# Patient Record
Sex: FEMALE | Race: WHITE
[De-identification: names, ages, dates, MRNs, and addresses within clinical notes are randomized per-mention and may not be internally consistent; named-entity substitution may affect disease eponyms.]

---

## 2021-12-06 ENCOUNTER — NURSE TRIAGE (OUTPATIENT)
Dept: OTHER | Facility: CLINIC | Age: 36
End: 2021-12-06

## 2021-12-06 NOTE — TELEPHONE ENCOUNTER
Received call from Tonja Ceballos at [facility] with [employer]. Brief description of triage: dizziness    Triage indicates for patient to go to office now. Pt does not have a PCP. Pt instructed to go to urgent care, walk in clinic, Emergency Department for care to not wait for new pt appt. Agreeable. Care advice provided, patient verbalizes understanding; denies any other questions or concerns; instructed to call back for any new or worsening symptoms. Scheduling dept to call pt back to get established as new patient. 10:41:Maricel Fuentes Abide with scheduling acknowledged. Attention Provider: Thank you for allowing me to participate in the care of your patient. The patient was connected to triage in response to information provided to the [facility]. Please do not respond through this encounter as the response is not directed to a shared pool. Reason for Disposition   Lightheadedness (dizziness) present now, after 2 hours of rest and fluids    Answer Assessment - Initial Assessment Questions  1. DESCRIPTION: \"Describe your dizziness. \"      Woozy. 2. LIGHTHEADED: \"Do you feel lightheaded? \" (e.g., somewhat faint, woozy, weak upon standing)      Near fainting sensation    3. VERTIGO: \"Do you feel like either you or the room is spinning or tilting? \" (i.e. vertigo)      Sometimes sees speck of light    4. SEVERITY: \"How bad is it? \"  \"Do you feel like you are going to faint? \" \"Can you stand and walk? \"    - MILD - walking normally    - MODERATE - interferes with normal activities (e.g., work, school)     - SEVERE - unable to stand, requires support to walk, feels like passing out now. Sometimes feels like she has to hold onto something. Not like going to fall over. Has fainted in the past. Sits down before she gets to that point. States was dizzy for hours, had 30 mins without and then dizziness returned. Constant today. 5. ONSET:  \"When did the dizziness begin? \"      Yesterday.      6. AGGRAVATING FACTORS: \"Does anything make it worse? \" (e.g., standing, change in head position)      Constant today. Denies known trigger    7. HEART RATE: \"Can you tell me your heart rate? \" \"How many beats in 15 seconds? \"  (Note: not all patients can do this)        Felt like it was racing yesterday    8. CAUSE: \"What do you think is causing the dizziness? \"      Denies diet or med change. 24th got COVID booster and lightheaded 48 hours afterwards. No other changes    9. RECURRENT SYMPTOM: \"Have you had dizziness before? \" If so, ask: \"When was the last time? \" \"What happened that time? \"      Syncope 6 years ago. Lightheaded is common s/s when she has a cold or illness    10. OTHER SYMPTOMS: \"Do you have any other symptoms? \" (e.g., fever, chest pain, vomiting, diarrhea, bleeding)        Denies: CP, SOB, n/v/d    11. PREGNANCY: \"Is there any chance you are pregnant? \" \"When was your last menstrual period? \"        2 weeks ago    Protocols used: [id number]